# Patient Record
Sex: MALE | Race: WHITE | NOT HISPANIC OR LATINO
[De-identification: names, ages, dates, MRNs, and addresses within clinical notes are randomized per-mention and may not be internally consistent; named-entity substitution may affect disease eponyms.]

---

## 2017-03-13 ENCOUNTER — APPOINTMENT (OUTPATIENT)
Dept: ORTHOPEDIC SURGERY | Facility: CLINIC | Age: 62
End: 2017-03-13

## 2017-03-13 VITALS
SYSTOLIC BLOOD PRESSURE: 100 MMHG | HEIGHT: 68 IN | DIASTOLIC BLOOD PRESSURE: 58 MMHG | HEART RATE: 50 BPM | BODY MASS INDEX: 22.88 KG/M2 | WEIGHT: 151 LBS

## 2017-08-30 ENCOUNTER — APPOINTMENT (OUTPATIENT)
Dept: ORTHOPEDIC SURGERY | Facility: CLINIC | Age: 62
End: 2017-08-30
Payer: COMMERCIAL

## 2017-08-30 VITALS
HEART RATE: 44 BPM | SYSTOLIC BLOOD PRESSURE: 109 MMHG | HEIGHT: 68 IN | WEIGHT: 150 LBS | DIASTOLIC BLOOD PRESSURE: 63 MMHG | BODY MASS INDEX: 22.73 KG/M2

## 2017-08-30 DIAGNOSIS — M75.41 IMPINGEMENT SYNDROME OF RIGHT SHOULDER: ICD-10-CM

## 2017-08-30 DIAGNOSIS — M17.31 UNILATERAL POST-TRAUMATIC OSTEOARTHRITIS, RIGHT KNEE: ICD-10-CM

## 2017-08-30 PROCEDURE — 73030 X-RAY EXAM OF SHOULDER: CPT | Mod: RT

## 2017-08-30 PROCEDURE — 99214 OFFICE O/P EST MOD 30 MIN: CPT

## 2017-08-30 PROCEDURE — 73564 X-RAY EXAM KNEE 4 OR MORE: CPT | Mod: RT

## 2017-08-30 RX ORDER — HYDROCODONE BITARTRATE AND ACETAMINOPHEN 10; 300 MG/1; MG/1
10-300 TABLET ORAL
Qty: 60 | Refills: 0 | Status: DISCONTINUED | COMMUNITY
Start: 2017-05-08

## 2017-08-30 RX ORDER — AMOXICILLIN 500 MG/1
500 CAPSULE ORAL
Qty: 21 | Refills: 0 | Status: DISCONTINUED | COMMUNITY
Start: 2017-05-17

## 2017-09-05 ENCOUNTER — TRANSCRIPTION ENCOUNTER (OUTPATIENT)
Age: 62
End: 2017-09-05

## 2017-09-08 ENCOUNTER — TRANSCRIPTION ENCOUNTER (OUTPATIENT)
Age: 62
End: 2017-09-08

## 2018-02-05 ENCOUNTER — APPOINTMENT (OUTPATIENT)
Dept: ORTHOPEDIC SURGERY | Facility: CLINIC | Age: 63
End: 2018-02-05
Payer: MEDICARE

## 2018-02-05 VITALS
SYSTOLIC BLOOD PRESSURE: 103 MMHG | DIASTOLIC BLOOD PRESSURE: 69 MMHG | HEART RATE: 59 BPM | BODY MASS INDEX: 22.73 KG/M2 | HEIGHT: 68 IN | WEIGHT: 150 LBS

## 2018-02-05 PROCEDURE — 99214 OFFICE O/P EST MOD 30 MIN: CPT

## 2018-02-05 PROCEDURE — 72100 X-RAY EXAM L-S SPINE 2/3 VWS: CPT

## 2018-02-05 RX ORDER — TIZANIDINE 4 MG/1
4 TABLET ORAL
Qty: 30 | Refills: 1 | Status: ACTIVE | COMMUNITY
Start: 2018-02-05 | End: 1900-01-01

## 2019-02-04 ENCOUNTER — APPOINTMENT (OUTPATIENT)
Dept: ORTHOPEDIC SURGERY | Facility: CLINIC | Age: 64
End: 2019-02-04
Payer: MEDICARE

## 2019-02-04 VITALS
WEIGHT: 150 LBS | BODY MASS INDEX: 22.73 KG/M2 | SYSTOLIC BLOOD PRESSURE: 127 MMHG | DIASTOLIC BLOOD PRESSURE: 70 MMHG | HEART RATE: 52 BPM | HEIGHT: 68 IN

## 2019-02-04 PROCEDURE — 99213 OFFICE O/P EST LOW 20 MIN: CPT

## 2019-02-04 RX ORDER — MELOXICAM 7.5 MG/1
7.5 TABLET ORAL TWICE DAILY
Qty: 60 | Refills: 1 | Status: ACTIVE | COMMUNITY
Start: 2019-02-04 | End: 1900-01-01

## 2019-02-04 NOTE — DISCUSSION/SUMMARY
[Medication Risks Reviewed] : Medication risks reviewed [Surgical risks reviewed] : Surgical risks reviewed [de-identified] : Physical therapy prescription\par Mobic 7.5mg twice a day for flare ups take for 10 days\par return in 2 months

## 2019-02-04 NOTE — PHYSICAL EXAM
[Shuffling] : shuffling [UE/LE] : Sensory: Intact in bilateral upper & lower extremities [ALL] : dorsalis pedis, posterior tibial, femoral, popliteal, and radial 2+ and symmetric bilaterally [Normal] : Oriented to person, place, and time, insight and judgement were intact and the affect was normal [Poor Appearance] : well-appearing [Acute Distress] : not in acute distress [de-identified] : negative fabers [de-identified] : N

## 2019-02-04 NOTE — HISTORY OF PRESENT ILLNESS
[Pain Location] : pain [Lumbar] : lumbar region [3] : a minimum pain level of 3/10 [6] : a maximum pain level of 6/10 [Prolonged Standing] : worsened by prolonged standing [Standing] : worsened by standing [NSAIDs] : relieved by nonsteroidal anti-inflammatory drugs [de-identified] : Patient complained lower back pain, especially at night sleeping. Patient does have some good days and bad days takes Advil providing with some relief.\par Patient goes for long walks and cold days walks in the mall. [Rest] : not relieved with rest

## 2019-04-03 ENCOUNTER — APPOINTMENT (OUTPATIENT)
Dept: ORTHOPEDIC SURGERY | Facility: CLINIC | Age: 64
End: 2019-04-03

## 2019-11-08 ENCOUNTER — TRANSCRIPTION ENCOUNTER (OUTPATIENT)
Age: 64
End: 2019-11-08

## 2019-11-18 ENCOUNTER — APPOINTMENT (OUTPATIENT)
Dept: ORTHOPEDIC SURGERY | Facility: CLINIC | Age: 64
End: 2019-11-18
Payer: MEDICARE

## 2019-11-18 VITALS — HEIGHT: 68 IN | WEIGHT: 150 LBS | BODY MASS INDEX: 22.73 KG/M2

## 2019-11-18 PROCEDURE — 99214 OFFICE O/P EST MOD 30 MIN: CPT

## 2019-11-18 PROCEDURE — 72110 X-RAY EXAM L-2 SPINE 4/>VWS: CPT

## 2019-11-18 RX ORDER — DICLOFENAC SODIUM 75 MG/1
75 TABLET, DELAYED RELEASE ORAL
Qty: 60 | Refills: 3 | Status: ACTIVE | COMMUNITY
Start: 2019-11-18 | End: 1900-01-01

## 2019-12-02 ENCOUNTER — APPOINTMENT (OUTPATIENT)
Dept: ORTHOPEDIC SURGERY | Facility: CLINIC | Age: 64
End: 2019-12-02
Payer: MEDICARE

## 2019-12-02 VITALS — WEIGHT: 150 LBS | BODY MASS INDEX: 22.73 KG/M2 | HEIGHT: 68 IN

## 2019-12-02 PROCEDURE — 99213 OFFICE O/P EST LOW 20 MIN: CPT

## 2020-04-06 ENCOUNTER — APPOINTMENT (OUTPATIENT)
Dept: ORTHOPEDIC SURGERY | Facility: CLINIC | Age: 65
End: 2020-04-06

## 2020-09-23 ENCOUNTER — APPOINTMENT (OUTPATIENT)
Dept: ORTHOPEDIC SURGERY | Facility: CLINIC | Age: 65
End: 2020-09-23
Payer: MEDICARE

## 2020-09-23 VITALS — WEIGHT: 150 LBS | HEIGHT: 68 IN | BODY MASS INDEX: 22.73 KG/M2 | TEMPERATURE: 97.2 F

## 2020-09-23 PROCEDURE — 99214 OFFICE O/P EST MOD 30 MIN: CPT

## 2020-09-23 PROCEDURE — 72050 X-RAY EXAM NECK SPINE 4/5VWS: CPT

## 2020-09-23 NOTE — PHYSICAL EXAM
[UE] : Sensory: Intact in bilateral upper extremities [Bicep] : biceps 2+ and symmetric bilaterally [B.R.] : biceps 2+ and symmetric bilaterally [Tricep] : triceps 2+ and symmetric bilaterally [Normal RUE] : Right Upper Extremity: No scars, rashes, lesions, ulcers, skin intact [Normal LUE] : Left Upper Extremity: No scars, rashes, lesions, ulcers, skin intact [Normal] : Oriented to person, place, and time, insight and judgement were intact and the affect was normal [de-identified] : Range of motion is full to flexion to 40 degrees, extension to 30 degrees, rotation to the right of 70 degrees, and rotation to the left of 30 degrees.  There is left trapezial tenderness to palpation. [de-identified] : AP lateral and flexion-extension x-rays obtained of the cervical spine show multilevel cervical discogenic disease.  When compared to prior studies of 2011 the patient has significantly advancing multilevel discogenic disease.  There are no obvious bony lesions, instability patterns, or obvious fractures noted.

## 2020-09-23 NOTE — DISCUSSION/SUMMARY
[Medication Risks Reviewed] : Medication risks reviewed [de-identified] : The patient is already exercising and stretching.  Additional physical therapy probably would not benefit the patient at this time.  Is recommended that the patient try Voltaren 75 mg p.o. twice daily.  Patient will follow-up with us in 3 weeks.  If this is helpful then we will consider restarting physical therapy and/or pain management at that time.  Patient understands and is willing to proceed.

## 2020-09-23 NOTE — HISTORY OF PRESENT ILLNESS
[de-identified] : The patient is a pleasant 65-year-old male well-known to us for chronic lumbar discogenic disease who was seen in the past for neck pain who presents with with a history of worsening neck pain traveling into the trapezius but not into each arm bilaterally for several months.  He denies any history of trauma or injury he feels it may be positional related to how he sleeps.  Patient sleeps on his stomach with his neck rotated to the right.  His neck pain is predominantly left-sided.  He has no change in his back pain at this time and presents for consultation. [Pain Location] : pain [C-Spine] : C-spine region [Stable] : stable [___ mths] : [unfilled] month(s) ago [6] : a current pain level of 6/10 [5] : a minimum pain level of 5/10 [7] : a maximum pain level of 7/10

## 2020-10-14 ENCOUNTER — APPOINTMENT (OUTPATIENT)
Dept: ORTHOPEDIC SURGERY | Facility: CLINIC | Age: 65
End: 2020-10-14
Payer: MEDICARE

## 2020-10-14 VITALS — WEIGHT: 150 LBS | BODY MASS INDEX: 22.73 KG/M2 | HEIGHT: 68 IN | TEMPERATURE: 98 F

## 2020-10-14 PROCEDURE — 99213 OFFICE O/P EST LOW 20 MIN: CPT

## 2021-02-26 ENCOUNTER — RX RENEWAL (OUTPATIENT)
Age: 66
End: 2021-02-26

## 2021-04-14 ENCOUNTER — APPOINTMENT (OUTPATIENT)
Dept: ORTHOPEDIC SURGERY | Facility: CLINIC | Age: 66
End: 2021-04-14
Payer: MEDICARE

## 2021-04-14 VITALS — HEART RATE: 60 BPM | DIASTOLIC BLOOD PRESSURE: 58 MMHG | SYSTOLIC BLOOD PRESSURE: 97 MMHG | TEMPERATURE: 97.1 F

## 2021-04-14 PROCEDURE — 99072 ADDL SUPL MATRL&STAF TM PHE: CPT

## 2021-04-14 PROCEDURE — 99213 OFFICE O/P EST LOW 20 MIN: CPT

## 2021-04-26 ENCOUNTER — RX RENEWAL (OUTPATIENT)
Age: 66
End: 2021-04-26

## 2021-06-28 ENCOUNTER — RX RENEWAL (OUTPATIENT)
Age: 66
End: 2021-06-28

## 2021-08-30 ENCOUNTER — RX RENEWAL (OUTPATIENT)
Age: 66
End: 2021-08-30

## 2021-08-30 RX ORDER — DICLOFENAC SODIUM 75 MG/1
75 TABLET, DELAYED RELEASE ORAL
Qty: 60 | Refills: 1 | Status: ACTIVE | COMMUNITY
Start: 2020-09-23 | End: 1900-01-01

## 2021-12-20 ENCOUNTER — NON-APPOINTMENT (OUTPATIENT)
Age: 66
End: 2021-12-20

## 2021-12-20 ENCOUNTER — APPOINTMENT (OUTPATIENT)
Dept: ORTHOPEDIC SURGERY | Facility: CLINIC | Age: 66
End: 2021-12-20
Payer: MEDICARE

## 2021-12-20 VITALS — SYSTOLIC BLOOD PRESSURE: 117 MMHG | DIASTOLIC BLOOD PRESSURE: 69 MMHG | HEART RATE: 55 BPM

## 2021-12-20 PROCEDURE — 99215 OFFICE O/P EST HI 40 MIN: CPT

## 2021-12-20 PROCEDURE — 72110 X-RAY EXAM L-2 SPINE 4/>VWS: CPT

## 2021-12-20 RX ORDER — IPRATROPIUM BROMIDE 42 UG/1
0.06 SPRAY NASAL
Qty: 45 | Refills: 0 | Status: ACTIVE | COMMUNITY
Start: 2021-12-07

## 2021-12-20 RX ORDER — BUPROPION HYDROCHLORIDE 150 MG/1
150 TABLET, EXTENDED RELEASE ORAL
Qty: 90 | Refills: 0 | Status: ACTIVE | COMMUNITY
Start: 2021-02-15

## 2021-12-20 RX ORDER — METHYLPREDNISOLONE 4 MG/1
4 TABLET ORAL
Qty: 1 | Refills: 0 | Status: ACTIVE | COMMUNITY
Start: 2021-12-20 | End: 1900-01-01

## 2021-12-27 ENCOUNTER — APPOINTMENT (OUTPATIENT)
Dept: ORTHOPEDIC SURGERY | Facility: CLINIC | Age: 66
End: 2021-12-27
Payer: MEDICARE

## 2021-12-27 VITALS — DIASTOLIC BLOOD PRESSURE: 75 MMHG | SYSTOLIC BLOOD PRESSURE: 116 MMHG | HEART RATE: 59 BPM

## 2021-12-27 PROCEDURE — 99214 OFFICE O/P EST MOD 30 MIN: CPT

## 2022-02-07 ENCOUNTER — APPOINTMENT (OUTPATIENT)
Dept: ORTHOPEDIC SURGERY | Facility: CLINIC | Age: 67
End: 2022-02-07
Payer: MEDICARE

## 2022-02-07 VITALS — DIASTOLIC BLOOD PRESSURE: 71 MMHG | HEART RATE: 62 BPM | SYSTOLIC BLOOD PRESSURE: 115 MMHG

## 2022-02-07 PROCEDURE — 99214 OFFICE O/P EST MOD 30 MIN: CPT

## 2022-02-07 RX ORDER — SULFAMETHOXAZOLE AND TRIMETHOPRIM 800; 160 MG/1; MG/1
800-160 TABLET ORAL
Qty: 10 | Refills: 0 | Status: ACTIVE | COMMUNITY
Start: 2022-01-13

## 2022-02-07 RX ORDER — TIZANIDINE 2 MG/1
2 TABLET ORAL
Qty: 30 | Refills: 0 | Status: ACTIVE | COMMUNITY
Start: 2022-01-06

## 2022-05-09 ENCOUNTER — APPOINTMENT (OUTPATIENT)
Dept: ORTHOPEDIC SURGERY | Facility: CLINIC | Age: 67
End: 2022-05-09
Payer: MEDICARE

## 2022-05-09 VITALS — DIASTOLIC BLOOD PRESSURE: 62 MMHG | HEART RATE: 54 BPM | SYSTOLIC BLOOD PRESSURE: 104 MMHG

## 2022-05-09 DIAGNOSIS — M54.12 RADICULOPATHY, CERVICAL REGION: ICD-10-CM

## 2022-05-09 PROCEDURE — 99214 OFFICE O/P EST MOD 30 MIN: CPT

## 2022-11-09 ENCOUNTER — APPOINTMENT (OUTPATIENT)
Dept: ORTHOPEDIC SURGERY | Facility: CLINIC | Age: 67
End: 2022-11-09

## 2022-11-09 VITALS — SYSTOLIC BLOOD PRESSURE: 109 MMHG | DIASTOLIC BLOOD PRESSURE: 67 MMHG | HEART RATE: 62 BPM

## 2022-11-09 DIAGNOSIS — M41.9 SCOLIOSIS, UNSPECIFIED: ICD-10-CM

## 2022-11-09 PROCEDURE — 99214 OFFICE O/P EST MOD 30 MIN: CPT

## 2022-11-09 RX ORDER — COVID-19 ANTIGEN TEST
KIT MISCELLANEOUS
Qty: 8 | Refills: 0 | Status: ACTIVE | COMMUNITY
Start: 2022-10-20

## 2022-11-09 RX ORDER — DRONEDARONE 400 MG/1
400 TABLET, FILM COATED ORAL
Qty: 180 | Refills: 0 | Status: ACTIVE | COMMUNITY
Start: 2022-08-12

## 2022-11-09 RX ORDER — APIXABAN 5 MG/1
5 TABLET, FILM COATED ORAL
Qty: 30 | Refills: 0 | Status: ACTIVE | COMMUNITY
Start: 2022-07-14

## 2023-03-31 ENCOUNTER — NON-APPOINTMENT (OUTPATIENT)
Age: 68
End: 2023-03-31

## 2023-04-01 ENCOUNTER — APPOINTMENT (OUTPATIENT)
Dept: ORTHOPEDIC SURGERY | Facility: CLINIC | Age: 68
End: 2023-04-01
Payer: MEDICARE

## 2023-04-01 ENCOUNTER — OUTPATIENT (OUTPATIENT)
Dept: OUTPATIENT SERVICES | Facility: HOSPITAL | Age: 68
LOS: 1 days | End: 2023-04-01
Payer: MEDICARE

## 2023-04-01 ENCOUNTER — APPOINTMENT (OUTPATIENT)
Dept: MRI IMAGING | Facility: IMAGING CENTER | Age: 68
End: 2023-04-01
Payer: MEDICARE

## 2023-04-01 DIAGNOSIS — M48.061 SPINAL STENOSIS, LUMBAR REGION WITHOUT NEUROGENIC CLAUDICATION: ICD-10-CM

## 2023-04-01 PROCEDURE — 72148 MRI LUMBAR SPINE W/O DYE: CPT

## 2023-04-01 PROCEDURE — 72148 MRI LUMBAR SPINE W/O DYE: CPT | Mod: 26

## 2023-04-01 PROCEDURE — 99214 OFFICE O/P EST MOD 30 MIN: CPT

## 2023-04-01 NOTE — HISTORY OF PRESENT ILLNESS
[Standing] : standing [Intermit.] : ~He/She~ states the symptoms seem to be intermittent [de-identified] : Patient presents a follow-up visit for lumbar stenosis. The patient reports he is now experiencing sciatica nerve pain in his legs at this time. He reports a recent incident where he fell off a cinder block and injured his feet. He denies any weakness, buckling or giving away of his legs. His symptoms are exacerbated when he begins to stretch his upper body or when he stands up  after siting; typically worse in the evening. \par  [de-identified] : Stretching Upper Body

## 2023-04-01 NOTE — ADDENDUM
[FreeTextEntry1] : I, He Duran Jr, documented this note as a scribe on the behalf of Dr. Sukumar Monterroso MD.

## 2023-04-01 NOTE — DISCUSSION/SUMMARY
[2 Weeks] : in 2 weeks [de-identified] : I expressed to the patient that at this time, it's best to obtain an MRI of his lumbar spine for further evaluation. I provided the patient with a script for and MRI and advised him to see Dr. Wagner if he still has any severe pain to his feet after 4 weeks. THe patient will follow-up in 1-2 weeks to review the results of the MRI.

## 2023-04-01 NOTE — PHYSICAL EXAM
[1+] : right ankle jerk 1+ [2+] : left ankle jerk 2+ [SLR] : negative straight leg raise [LE] : 5/5 motor strength in bilateral lower extremities [] : Sensory: [L3-Bilat] : L3 [Normal RLE] : Right Lower Extremity: No scars, rashes, lesions, ulcers, skin intact [Normal LLE] : Left Lower Extremity: No scars, rashes, lesions, ulcers, skin intact [Normal] : Oriented to person, place, and time, insight and judgement were intact and the affect was normal [de-identified] : negative joleen test bilaterally

## 2023-04-10 ENCOUNTER — APPOINTMENT (OUTPATIENT)
Dept: ORTHOPEDIC SURGERY | Facility: CLINIC | Age: 68
End: 2023-04-10
Payer: MEDICARE

## 2023-04-10 VITALS — HEART RATE: 68 BPM | SYSTOLIC BLOOD PRESSURE: 109 MMHG | DIASTOLIC BLOOD PRESSURE: 78 MMHG

## 2023-04-10 DIAGNOSIS — M48.02 SPINAL STENOSIS, CERVICAL REGION: ICD-10-CM

## 2023-04-10 PROCEDURE — 99214 OFFICE O/P EST MOD 30 MIN: CPT

## 2023-04-10 RX ORDER — FLECAINIDE ACETATE 50 MG/1
50 TABLET ORAL
Qty: 180 | Refills: 0 | Status: ACTIVE | COMMUNITY
Start: 2023-03-03

## 2023-04-10 RX ORDER — DICLOFENAC SODIUM 100 MG/1
100 TABLET, FILM COATED, EXTENDED RELEASE ORAL
Qty: 90 | Refills: 0 | Status: ACTIVE | COMMUNITY
Start: 2022-12-15

## 2023-04-10 RX ORDER — CELECOXIB 200 MG/1
200 CAPSULE ORAL
Qty: 90 | Refills: 0 | Status: ACTIVE | COMMUNITY
Start: 2022-12-29

## 2023-04-10 NOTE — DISCUSSION/SUMMARY
[PRN] : PRN [de-identified] : I expressed to the patient that baed on his MRI findings, that his symptoms are related to a stress reaction due to recent activity. I advised the patient that he has arthritis, however it is not rapidly progressing and does not require immediate attention. I explained to the patient that it would be best to be cautious about his activity and to monitor his symptoms. The patient will follow-up with me as needed. \par \par

## 2023-04-10 NOTE — PHYSICAL EXAM
[LE] : 5/5 motor strength in bilateral lower extremities [] : Sensory: [L3-Bilat] : L3 [1+] : right ankle jerk 1+ [2+] : left ankle jerk 2+ [Normal RLE] : Right Lower Extremity: No scars, rashes, lesions, ulcers, skin intact [Normal LLE] : Left Lower Extremity: No scars, rashes, lesions, ulcers, skin intact [Normal] : Oriented to person, place, and time, insight and judgement were intact and the affect was normal [SLR] : negative straight leg raise [de-identified] : negative joleen test bilaterally  [de-identified] : MRI Evaluation of the Lumbar Spine performed on 4/1/23 shows Severe Arthritis. Multilevel Degeneration Disease. Mild Multilevel Spinal Stenosis. Moderate to Severe foraminal narrowing stress reaction at L1-2. Compared to 2018, findings are similar in contrast to Stress Reaction at L1-2. //\par Stress Fracture of the endplate at L1 worse on the right at L1-2 and Superior endplate of L2 worse on the left at L2-3. Worsened Stenosis at L4-5.\par \par MRI Evaluation of the Cervical Spine performed on 4/5/22 shows Multilevel disc Osteophyte Complexes without Spinal Cord or Significant Nerve Compression.

## 2023-04-10 NOTE — HISTORY OF PRESENT ILLNESS
[Intermit.] : ~He/She~ states the symptoms seem to be intermittent [de-identified] : Patient presents a follow-up visit to review the recent results of the Lumbar & Cervical Spine. The patient reports no significant changes to his symptoms at this time. He currently has symptoms of tingling & numbness. The patient reports he takes Celebrex to aid his symptoms. He has brought previous MRI of his Cervical Spine from 2022 to review as well.

## 2023-04-14 ENCOUNTER — APPOINTMENT (OUTPATIENT)
Dept: ORTHOPEDIC SURGERY | Facility: CLINIC | Age: 68
End: 2023-04-14
Payer: MEDICARE

## 2023-04-14 DIAGNOSIS — M79.671 PAIN IN RIGHT FOOT: ICD-10-CM

## 2023-04-14 PROCEDURE — 99213 OFFICE O/P EST LOW 20 MIN: CPT

## 2023-04-14 PROCEDURE — 73610 X-RAY EXAM OF ANKLE: CPT | Mod: RT

## 2023-04-14 PROCEDURE — 73630 X-RAY EXAM OF FOOT: CPT | Mod: RT

## 2023-04-14 NOTE — DISCUSSION/SUMMARY
[de-identified] : 25 minutes was spent reviewing the x-rays as well as discussing with the patient their clinical presentation, diagnosis and providing education.  The x-rays reviewed the patient.  The patient appears to be suffering from an acute Achilles injury.  At this time he has started to improve with the conservative treatment modalities he has not played.  At this time he will continue with those modalities inclusive of diclofenac pills in the sleeve.  He was offered outpatient physical therapy but declined.  He will perform gentle home exercises with use of online educational material.  Additionally he is recommended 1/4 inch shoe lift.  He will obtain this at local drug supply store.  He will use this for the next 2 to 3 weeks.  He is anticipated he will continue to improve but if he does not make sufficient progress at about a month's time from the injury he will be seen back in the office.  All questions were answered to the patient's satisfaction.

## 2023-04-14 NOTE — HISTORY OF PRESENT ILLNESS
[de-identified] : Patient presents today for evaluation of right ankle pain.  He reports of pain in the Achilles area.  He had about 2-1/2 weeks.  He reports he was trying to hang a bird house while standing on a cinder block on his long-axis when the cinderblock kicked out.  At that point he went into a hyperdorsiflexion injury and reports of developing pain in the right lower calf/Achilles area.  He states he has been using Celebrex as well as an ankle wrap.  He states he has had some improvement since the onset.  He denies of any other injuries.  He does currently wear orthotics in both shoes.  He has pain when he goes into deep dorsiflexion of the right ankle.  No pain with deep plantarflexion.  No use of a cane or a walker.\par \par Review of Systems-\par Constitutional: No fever or chills. \par Cardiovascular: No orthopnea or chest pain\par Pulmonary: No shortness of breath. \par GI: No nausea or vomiting or abdominal pain.\par Musculoskeletal: see HPI \par Psychiatric: No anxiety and depression.

## 2023-04-14 NOTE — PHYSICAL EXAM
[de-identified] : The patient is conversive and in no apparent distress. The patient is alert and oriented to person, place, and time. Affect and mood appear normal. The head is normocephalic and atraumatic. Skin shows normal turgor with no evidence of eczema or psoriasis. No respiratory distress noted. Sensation grossly intact. MUSCULOSKELETAL:   SEE BELOW\par \par Right ankle foot exam demonstrates skin is clean, dry intact.  No surgical scars.  No signs of acute trauma.  No erythema or ecchymosis.  No soft tissue swelling.  No Diana's deformity.  Normal alignment of the ankle and the foot.  No bony tenderness to the foot.  No bony tenderness ankle.  Achilles intact.  No deficits.  Mcconnell's testing is negative.  There is some discomfort at the myotendinous junction approximately 8 to 10 cm up from the insertion of the Achilles at the calcaneus.  No other calf tenderness is appreciated.\par  [de-identified] : Three-view right foot and 3 view right ankle x-rays were reviewed.  Normal ankle mortise.  No fractures.  No effusions are appreciated on the lateral view.  No bony prominences or large osteophyte formation is appreciated.  No retained hardware.

## 2023-05-30 ENCOUNTER — APPOINTMENT (OUTPATIENT)
Dept: ORTHOPEDIC SURGERY | Facility: CLINIC | Age: 68
End: 2023-05-30
Payer: MEDICARE

## 2023-05-30 VITALS — SYSTOLIC BLOOD PRESSURE: 111 MMHG | DIASTOLIC BLOOD PRESSURE: 75 MMHG | HEART RATE: 66 BPM

## 2023-05-30 DIAGNOSIS — S86.019A STRAIN OF UNSPECIFIED ACHILLES TENDON, INITIAL ENCOUNTER: ICD-10-CM

## 2023-05-30 PROCEDURE — 99213 OFFICE O/P EST LOW 20 MIN: CPT

## 2023-05-30 PROCEDURE — 99203 OFFICE O/P NEW LOW 30 MIN: CPT

## 2023-06-06 ENCOUNTER — OUTPATIENT (OUTPATIENT)
Dept: OUTPATIENT SERVICES | Facility: HOSPITAL | Age: 68
LOS: 1 days | End: 2023-06-06
Payer: COMMERCIAL

## 2023-06-06 ENCOUNTER — APPOINTMENT (OUTPATIENT)
Dept: MRI IMAGING | Facility: HOSPITAL | Age: 68
End: 2023-06-06
Payer: MEDICARE

## 2023-06-06 DIAGNOSIS — S86.019A STRAIN OF UNSPECIFIED ACHILLES TENDON, INITIAL ENCOUNTER: ICD-10-CM

## 2023-06-06 PROCEDURE — 73721 MRI JNT OF LWR EXTRE W/O DYE: CPT | Mod: 26,RT

## 2023-06-06 PROCEDURE — 73721 MRI JNT OF LWR EXTRE W/O DYE: CPT

## 2023-06-13 ENCOUNTER — APPOINTMENT (OUTPATIENT)
Dept: ORTHOPEDIC SURGERY | Facility: CLINIC | Age: 68
End: 2023-06-13

## 2024-01-30 ENCOUNTER — OUTPATIENT (OUTPATIENT)
Dept: OUTPATIENT SERVICES | Facility: HOSPITAL | Age: 69
LOS: 1 days | End: 2024-01-30
Payer: MEDICARE

## 2024-01-30 ENCOUNTER — APPOINTMENT (OUTPATIENT)
Dept: MRI IMAGING | Facility: CLINIC | Age: 69
End: 2024-01-30
Payer: MEDICARE

## 2024-01-30 DIAGNOSIS — R22.1 LOCALIZED SWELLING, MASS AND LUMP, NECK: ICD-10-CM

## 2024-01-30 DIAGNOSIS — Z00.00 ENCOUNTER FOR GENERAL ADULT MEDICAL EXAMINATION WITHOUT ABNORMAL FINDINGS: ICD-10-CM

## 2024-01-30 PROCEDURE — A9585: CPT

## 2024-01-30 PROCEDURE — 70543 MRI ORBT/FAC/NCK W/O &W/DYE: CPT

## 2024-01-30 PROCEDURE — 70543 MRI ORBT/FAC/NCK W/O &W/DYE: CPT | Mod: 26

## 2024-03-20 ENCOUNTER — APPOINTMENT (OUTPATIENT)
Dept: ORTHOPEDIC SURGERY | Facility: CLINIC | Age: 69
End: 2024-03-20
Payer: MEDICARE

## 2024-03-20 VITALS — SYSTOLIC BLOOD PRESSURE: 103 MMHG | HEART RATE: 53 BPM | DIASTOLIC BLOOD PRESSURE: 65 MMHG

## 2024-03-20 DIAGNOSIS — M50.30 OTHER CERVICAL DISC DEGENERATION, UNSPECIFIED CERVICAL REGION: ICD-10-CM

## 2024-03-20 PROCEDURE — 99214 OFFICE O/P EST MOD 30 MIN: CPT

## 2024-03-20 NOTE — DISCUSSION/SUMMARY
[Medication Risks Reviewed] : Medication risks reviewed [de-identified] : We are going to update the MRI of the lumbar spine and have the patient follow-up with us in 1 to 2 weeks to review the results.  He will continue to take diclofenac sodium for pain.

## 2024-03-20 NOTE — PHYSICAL EXAM
[LE] : 5/5 motor strength in bilateral lower extremities [L3-Bilat] : L3 [] : Sensory: [Normal] : Oriented to person, place, and time, insight and judgement were intact and the affect was normal [de-identified] : There is a limited range of motion to the lumbar spine in all planes. 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  All the other images are not decipherable for cervical spine pathology as they are taken of the soft tissues.  The patient understands this.  Of the limited study available it shows degenerative changes from C3-4 to C6-C7.  This is consistent with prior scans.

## 2024-03-20 NOTE — HISTORY OF PRESENT ILLNESS
[de-identified] : Patient returns for follow-up and states over the last 2 to 3 months patient's lower back pain has gotten worse.  It is a 6-10 out of 10 on the visual pain analog scale.  He feels more stiffness and neurogenic claudication type symptoms particularly at night.  For the last 2 days the patient took diclofenac sodium which seems to help.  He tried Celebrex which did not help.  He also brings in an MRI of the cervical spine that was taken as an updated study by his ear nose and throat specialist and would like a comment about the arthritis in the neck. [Worsening] : worsening [6] : a minimum pain level of 6/10 [10] : a maximum pain level of 10/10

## 2024-03-26 ENCOUNTER — APPOINTMENT (OUTPATIENT)
Dept: MRI IMAGING | Facility: CLINIC | Age: 69
End: 2024-03-26
Payer: MEDICARE

## 2024-03-26 ENCOUNTER — OUTPATIENT (OUTPATIENT)
Dept: OUTPATIENT SERVICES | Facility: HOSPITAL | Age: 69
LOS: 1 days | End: 2024-03-26
Payer: MEDICARE

## 2024-03-26 DIAGNOSIS — M48.061 SPINAL STENOSIS, LUMBAR REGION WITHOUT NEUROGENIC CLAUDICATION: ICD-10-CM

## 2024-03-26 PROCEDURE — 72148 MRI LUMBAR SPINE W/O DYE: CPT

## 2024-03-26 PROCEDURE — 72148 MRI LUMBAR SPINE W/O DYE: CPT | Mod: 26

## 2024-04-15 ENCOUNTER — APPOINTMENT (OUTPATIENT)
Dept: ORTHOPEDIC SURGERY | Facility: CLINIC | Age: 69
End: 2024-04-15
Payer: MEDICARE

## 2024-04-15 VITALS
WEIGHT: 156 LBS | BODY MASS INDEX: 23.64 KG/M2 | DIASTOLIC BLOOD PRESSURE: 69 MMHG | SYSTOLIC BLOOD PRESSURE: 107 MMHG | HEART RATE: 58 BPM | HEIGHT: 68 IN

## 2024-04-15 DIAGNOSIS — M48.061 SPINAL STENOSIS, LUMBAR REGION WITHOUT NEUROGENIC CLAUDICATION: ICD-10-CM

## 2024-04-15 PROCEDURE — 99214 OFFICE O/P EST MOD 30 MIN: CPT

## 2024-04-15 RX ORDER — BUPROPION HYDROCHLORIDE 75 MG/1
75 TABLET, FILM COATED ORAL
Qty: 90 | Refills: 0 | Status: ACTIVE | COMMUNITY
Start: 2023-11-24

## 2024-04-15 NOTE — ADDENDUM
[FreeTextEntry1] : I, He Duran Jr, acted solely as a scribe for Dr. Sukumar Monterroso on this date 04/15/2024 .  All medical record entries made by the Scribe were at my, Dr. Sukumar Monterroso, direction and personally dictated by me on 04/15/2024 . I have reviewed the chart and agree that the record accurately reflects my personal performance of the history, physical exam, assessment and plan. I have also personally directed, reviewed, and agreed with the chart.

## 2024-04-15 NOTE — HISTORY OF PRESENT ILLNESS
[Intermit.] : ~He/She~ states the symptoms seem to be intermittent [de-identified] : Patient presents a follow-up visit to review the results of a recent MRI of the Lumbar spine. The patient continues to have pain pertaining to his lower back. The patient reports no significant changes to their symptoms since their last visit. He states that he is not in significant pain at this time but will find pain with prolonged periods of working on his [employer]. He notes that he frequently walks. The patient currently takes no pain medication at this time.

## 2024-04-15 NOTE — DISCUSSION/SUMMARY
[PRN] : PRN [Medication Risks Reviewed] : Medication risks reviewed [de-identified] : I discussed the underlying pathophysiology of the patient's condition & MRI findings. I expressed to the patient that his symptoms are consistent with foraminal stenosis at multiple levels in his lower back. The patient and I discussed his options regarding treatment. Since the patient has found improvement to his symptoms, I advised that the patient should continue their current regimen if he has found some alleviation to his symptoms. Activity modifications were reviewed with the patient at length. The patient should continue to remain active and exercise regularly. If the patient begins to experience any changes or severe exacerbation of his symptoms, he should reach out to me as soon as possible. Otherwise, he should return to me as needed.

## 2024-04-15 NOTE — PHYSICAL EXAM
[LE] : 5/5 motor strength in bilateral lower extremities [] : Sensory: [L3-Bilat] : L3 [Normal] : Oriented to person, place, and time, insight and judgement were intact and the affect was normal [de-identified] : There is a limited range of motion to the lumbar spine in all planes. [de-identified] : MRI Evaluation of the Lumbar Spine performed on 03/26/2024 shows Moderate multilevel thoracolumbar spondylosis in the setting of mild S-shaped thoracolumbar scoliosis. *  Multilevel mild spinal canal stenosis. Multilevel moderate to severe foraminal narrowing. Contact on the right L4 exiting nerve. *  Stress reaction at L1-L2 and to a lesser extent L2-L3, L4-L5 and L5-S1.  Personal Review shows Significant amounts of Foraminal stenosis from L3-4 to L5-S1. Right Sided at L3-4, Bilateral at L4-5, and Left sided at L5-S1.

## 2024-04-19 RX ORDER — DICLOFENAC SODIUM 75 MG/1
75 TABLET, DELAYED RELEASE ORAL
Qty: 60 | Refills: 1 | Status: ACTIVE | COMMUNITY
Start: 2024-04-19 | End: 1900-01-01

## 2024-04-19 RX ORDER — MELOXICAM 15 MG/1
15 TABLET ORAL DAILY
Qty: 21 | Refills: 0 | Status: DISCONTINUED | COMMUNITY
Start: 2017-08-30 | End: 2024-04-19

## 2025-01-13 ENCOUNTER — APPOINTMENT (OUTPATIENT)
Dept: ORTHOPEDIC SURGERY | Facility: CLINIC | Age: 70
End: 2025-01-13
Payer: MEDICARE

## 2025-01-13 VITALS
WEIGHT: 150 LBS | SYSTOLIC BLOOD PRESSURE: 129 MMHG | HEIGHT: 66 IN | HEART RATE: 57 BPM | BODY MASS INDEX: 24.11 KG/M2 | DIASTOLIC BLOOD PRESSURE: 69 MMHG

## 2025-01-13 DIAGNOSIS — M17.31 UNILATERAL POST-TRAUMATIC OSTEOARTHRITIS, RIGHT KNEE: ICD-10-CM

## 2025-01-13 PROCEDURE — 99214 OFFICE O/P EST MOD 30 MIN: CPT

## 2025-01-13 PROCEDURE — 73564 X-RAY EXAM KNEE 4 OR MORE: CPT | Mod: RT

## 2025-01-15 ENCOUNTER — APPOINTMENT (OUTPATIENT)
Dept: ORTHOPEDIC SURGERY | Facility: CLINIC | Age: 70
End: 2025-01-15
Payer: MEDICARE

## 2025-01-15 VITALS — WEIGHT: 150 LBS | HEIGHT: 66 IN | BODY MASS INDEX: 24.11 KG/M2

## 2025-01-15 DIAGNOSIS — M48.061 SPINAL STENOSIS, LUMBAR REGION WITHOUT NEUROGENIC CLAUDICATION: ICD-10-CM

## 2025-01-15 DIAGNOSIS — M54.16 RADICULOPATHY, LUMBAR REGION: ICD-10-CM

## 2025-01-15 PROCEDURE — 99214 OFFICE O/P EST MOD 30 MIN: CPT

## 2025-01-15 PROCEDURE — 72110 X-RAY EXAM L-2 SPINE 4/>VWS: CPT

## 2025-01-15 RX ORDER — AZITHROMYCIN 250 MG/1
250 TABLET, FILM COATED ORAL
Qty: 6 | Refills: 0 | Status: ACTIVE | COMMUNITY
Start: 2024-10-28

## 2025-02-03 RX ORDER — HYLAN G-F 20 16MG/2ML
16 SYRINGE (ML) INTRAARTICULAR
Qty: 1 | Refills: 0 | Status: ACTIVE | COMMUNITY
Start: 2025-01-27

## 2025-02-13 ENCOUNTER — APPOINTMENT (OUTPATIENT)
Dept: ORTHOPEDIC SURGERY | Facility: CLINIC | Age: 70
End: 2025-02-13
Payer: MEDICARE

## 2025-02-13 PROCEDURE — 20611 DRAIN/INJ JOINT/BURSA W/US: CPT | Mod: RT

## 2025-02-13 RX ORDER — HYLAN G-F 20 16MG/2ML
16 SYRINGE (ML) INTRAARTICULAR
Refills: 0 | Status: COMPLETED | OUTPATIENT
Start: 2025-02-13

## 2025-02-13 RX ADMIN — Medication 0 MG/2ML: at 00:00

## 2025-02-20 ENCOUNTER — APPOINTMENT (OUTPATIENT)
Dept: ORTHOPEDIC SURGERY | Facility: CLINIC | Age: 70
End: 2025-02-20
Payer: MEDICARE

## 2025-02-20 PROCEDURE — 20611 DRAIN/INJ JOINT/BURSA W/US: CPT | Mod: RT

## 2025-02-20 RX ORDER — HYLAN G-F 20 16MG/2ML
16 SYRINGE (ML) INTRAARTICULAR
Refills: 0 | Status: COMPLETED | OUTPATIENT
Start: 2025-02-20

## 2025-02-20 RX ADMIN — Medication 2 MG/2ML: at 00:00

## 2025-02-27 ENCOUNTER — APPOINTMENT (OUTPATIENT)
Dept: ORTHOPEDIC SURGERY | Facility: CLINIC | Age: 70
End: 2025-02-27
Payer: MEDICARE

## 2025-02-27 DIAGNOSIS — M17.31 UNILATERAL POST-TRAUMATIC OSTEOARTHRITIS, RIGHT KNEE: ICD-10-CM

## 2025-02-27 PROCEDURE — 20611 DRAIN/INJ JOINT/BURSA W/US: CPT | Mod: RT

## 2025-02-27 RX ORDER — HYLAN G-F 20 16MG/2ML
16 SYRINGE (ML) INTRAARTICULAR
Refills: 0 | Status: COMPLETED | OUTPATIENT
Start: 2025-02-27

## 2025-02-27 RX ADMIN — Medication 2 MG/2ML: at 00:00

## 2025-03-26 ENCOUNTER — APPOINTMENT (OUTPATIENT)
Dept: ORTHOPEDIC SURGERY | Facility: CLINIC | Age: 70
End: 2025-03-26
Payer: MEDICARE

## 2025-03-26 DIAGNOSIS — M48.061 SPINAL STENOSIS, LUMBAR REGION WITHOUT NEUROGENIC CLAUDICATION: ICD-10-CM

## 2025-03-26 PROCEDURE — 99214 OFFICE O/P EST MOD 30 MIN: CPT
